# Patient Record
Sex: FEMALE | Race: WHITE | NOT HISPANIC OR LATINO | Employment: FULL TIME | URBAN - METROPOLITAN AREA
[De-identification: names, ages, dates, MRNs, and addresses within clinical notes are randomized per-mention and may not be internally consistent; named-entity substitution may affect disease eponyms.]

---

## 2017-02-27 ENCOUNTER — OFFICE VISIT (OUTPATIENT)
Dept: OBSTETRICS AND GYNECOLOGY | Age: 37
End: 2017-02-27

## 2017-02-27 VITALS
BODY MASS INDEX: 22.5 KG/M2 | HEIGHT: 63 IN | DIASTOLIC BLOOD PRESSURE: 80 MMHG | WEIGHT: 127 LBS | SYSTOLIC BLOOD PRESSURE: 100 MMHG

## 2017-02-27 DIAGNOSIS — Z01.419 ENCOUNTER FOR GYNECOLOGICAL EXAMINATION WITHOUT ABNORMAL FINDING: Primary | ICD-10-CM

## 2017-02-27 DIAGNOSIS — Z15.01 BRCA2 GENETIC CARRIER: ICD-10-CM

## 2017-02-27 DIAGNOSIS — Z15.09 BRCA2 GENETIC CARRIER: ICD-10-CM

## 2017-02-27 PROCEDURE — 99395 PREV VISIT EST AGE 18-39: CPT | Performed by: OBSTETRICS & GYNECOLOGY

## 2017-02-27 NOTE — PROGRESS NOTES
"Routine Annual Visit    2017    Patient: Jasmin Fuentes          MR#:[id number]      Chief Complaint   Patient presents with   • Annual Exam     PT HERE FOR ANNUAL EXAM, NO COMPLAINTS. LAST PAP  (NEG AND NEG HPV).       History of Present Illness    36 y.o. female  who presents for annual exam.   Girls 10,6 doing well  S/p mastectomy and reconstruction BRCA 2  BTL for BCM  Reg menses , no complaints          Patient's last menstrual period was 2017 (exact date).  Obstetric History:  OB History      Para Term  AB TAB SAB Ectopic Multiple Living    2 1 1       2         Menstrual History:     Patient's last menstrual period was 2017 (exact date).       Sexual History:       ________________________________________  Patient Active Problem List   Diagnosis   • BRCA2 genetic carrier       Past Medical History   Diagnosis Date   • BRCA2 positive    • DVT (deep venous thrombosis)    • Factor V Leiden mutation        No past surgical history on file.    History   Smoking Status   • Never Smoker   Smokeless Tobacco   • Not on file       has a current medication list which includes the following prescription(s): valacyclovir.  ________________________________________    Current contraception: tubal ligation  History of abnormal Pap smear: no  Family history of Breast cancer: yes  Family history of uterine or ovarian cancer: no  Family History of colon cancer/colon polyps: no  History of abnormal mammogram: no      The following portions of the patient's history were reviewed and updated as appropriate: allergies, current medications, past family history, past medical history, past social history, past surgical history and problem list.    Review of Systems    Pertinent items are noted in HPI.     Objective   Physical Exam    Visit Vitals   • /80   • Ht 63\" (160 cm)   • Wt 127 lb (57.6 kg)   • LMP 2017 (Exact Date)   • BMI 22.5 kg/m2      BP Readings from Last 3 " "Encounters:   02/27/17 100/80      Wt Readings from Last 3 Encounters:   02/27/17 127 lb (57.6 kg)      BMI: Estimated body mass index is 22.5 kg/(m^2) as calculated from the following:    Height as of this encounter: 63\" (160 cm).    Weight as of this encounter: 127 lb (57.6 kg).      General:   alert, appears stated age and cooperative   Abdomen: soft, non-tender, without masses or organomegaly   Breast: inspection negative, no nipple discharge or bleeding, no masses or nodularity palpable- all implant, no breast tissue   Vulva: normal   Vagina: normal mucosa   Cervix: no cervical motion tenderness and no lesions   Uterus: normal size, mobile or non-tender   Adnexa: normal adnexa and no mass, fullness, tenderness     Assessment:    1. Normal annual exam   Assessment     ICD-10-CM ICD-9-CM   1. Encounter for gynecological examination without abnormal finding Z01.419 V72.31   2. BRCA2 genetic carrier Z15.01 V84.01    Z15.02      Plan:    Plan     []  Mammogram request made  []  PAP done  []  Labs:   []  GC/Chl/TV  []  DEXA scan   []  Referral for colonoscopy:     No mammo- no breast tissue  sched GYN US  rec BSO age 38  "

## 2017-03-23 ENCOUNTER — PROCEDURE VISIT (OUTPATIENT)
Dept: OBSTETRICS AND GYNECOLOGY | Age: 37
End: 2017-03-23

## 2017-03-23 ENCOUNTER — OFFICE VISIT (OUTPATIENT)
Dept: OBSTETRICS AND GYNECOLOGY | Age: 37
End: 2017-03-23

## 2017-03-23 VITALS
BODY MASS INDEX: 23.55 KG/M2 | HEIGHT: 62 IN | DIASTOLIC BLOOD PRESSURE: 66 MMHG | SYSTOLIC BLOOD PRESSURE: 110 MMHG | WEIGHT: 128 LBS

## 2017-03-23 DIAGNOSIS — Z15.09 BRCA2 POSITIVE: Primary | ICD-10-CM

## 2017-03-23 DIAGNOSIS — D68.51 FACTOR 5 LEIDEN MUTATION, HETEROZYGOUS (HCC): ICD-10-CM

## 2017-03-23 DIAGNOSIS — Z15.09 BRCA2 GENETIC CARRIER: Primary | ICD-10-CM

## 2017-03-23 DIAGNOSIS — Z15.01 BRCA2 GENETIC CARRIER: Primary | ICD-10-CM

## 2017-03-23 DIAGNOSIS — Z15.01 BRCA2 POSITIVE: Primary | ICD-10-CM

## 2017-03-23 PROCEDURE — 99213 OFFICE O/P EST LOW 20 MIN: CPT | Performed by: OBSTETRICS & GYNECOLOGY

## 2017-03-23 PROCEDURE — 76830 TRANSVAGINAL US NON-OB: CPT | Performed by: OBSTETRICS & GYNECOLOGY

## 2017-03-23 RX ORDER — FLUTICASONE PROPIONATE 50 MCG
2 SPRAY, SUSPENSION (ML) NASAL DAILY
COMMUNITY

## 2017-03-23 RX ORDER — GUAIFENESIN 600 MG/1
1200 TABLET, EXTENDED RELEASE ORAL 2 TIMES DAILY
COMMUNITY
End: 2018-03-05

## 2017-03-23 NOTE — PROGRESS NOTES
"GYN Visit    3/23/2017    Patient: Jasmin Fuentes          MR#:[id number]      Chief Complaint   Patient presents with   • Imaging Only     PT HERE FOR GYN U/S DUE TO BRCA GENE 2 CARRIER. DOING WELL.       History of Present Illness    36 y.o. female  who presents for  Follow up US for BRCA 2 positive  Recommendation is 6 month US with   Remove ovaries at age 38  Pt wants to do BSO age 39  Menses regular, regular cramping,no unusual bloating  Cannot take ocps secondary to history of DVT and FV leiden  2 stable problems- surveillance today        Patient's last menstrual period was 2017 (exact date).    ________________________________________  Patient Active Problem List   Diagnosis   • BRCA2 genetic carrier   • Factor 5 Leiden mutation, heterozygous       Past Medical History:   Diagnosis Date   • BRCA2 positive    • DVT (deep venous thrombosis)    • Factor V Leiden mutation        No past surgical history on file.    History   Smoking Status   • Never Smoker   Smokeless Tobacco   • Not on file       has a current medication list which includes the following prescription(s): fluticasone and guaifenesin.  ________________________________________    Current contraception: tubal ligation      The following portions of the patient's history were reviewed and updated as appropriate: allergies, current medications, past family history, past medical history, past social history, past surgical history and problem list.    Review of Systems   Constitutional: Negative for appetite change.   Gastrointestinal: Negative for abdominal pain, constipation, diarrhea, nausea and vomiting.   Genitourinary: Negative for difficulty urinating, dysuria, menstrual problem and pelvic pain.   All other systems reviewed and are negative.           Objective   Physical Exam    /66  Ht 62\" (157.5 cm)  Wt 128 lb (58.1 kg)  LMP 2017 (Exact Date)  BMI 23.41 kg/m2   BP Readings from Last 3 Encounters: " "  03/23/17 110/66   02/27/17 100/80      Wt Readings from Last 3 Encounters:   03/23/17 128 lb (58.1 kg)   02/27/17 127 lb (57.6 kg)      BMI: Estimated body mass index is 23.41 kg/(m^2) as calculated from the following:    Height as of this encounter: 62\" (157.5 cm).    Weight as of this encounter: 128 lb (58.1 kg).      General:   alert, appears stated age and cooperative   Abdomen: soft, non-tender, without masses or organomegaly   Breast: Not examined today,no breast tissue   Vulva: normal   Vagina: normal mucosa   Cervix: no cervical motion tenderness and no lesions   Uterus: normal size, mobile or non-tender   Adnexa: normal adnexa and no mass, fullness, tenderness     US done in office:  See report- nl US, no free fluid, ovaries normal, non tender, uterus normal    Assessment:      Jasmin was seen today for imaging only.    Diagnoses and all orders for this visit:    BRCA2 positive  -         Factor 5 Leiden mutation, heterozygous        Discussed follow up with pt rec 6 month US with  and BSO around age 38  See genetics consult scanned into epic from 2008      "

## 2017-03-24 LAB — CANCER AG125 SERPL-ACNC: 56.7 U/ML (ref 0–38.1)

## 2017-03-27 ENCOUNTER — TELEPHONE (OUTPATIENT)
Dept: OBSTETRICS AND GYNECOLOGY | Age: 37
End: 2017-03-27

## 2017-03-27 DIAGNOSIS — R97.1 ELEVATED CA-125: ICD-10-CM

## 2017-03-27 DIAGNOSIS — Z15.09 BRCA2 GENETIC CARRIER: Primary | ICD-10-CM

## 2017-03-27 DIAGNOSIS — Z15.01 BRCA2 GENETIC CARRIER: Primary | ICD-10-CM

## 2017-03-27 NOTE — TELEPHONE ENCOUNTER
Spoke with pt regarding elevated - rec gyn onc consult to discuss and consider BSO.  Pt with no ovarian mass or free fluid on GYN US

## 2017-08-07 ENCOUNTER — TELEPHONE (OUTPATIENT)
Dept: OBSTETRICS AND GYNECOLOGY | Age: 37
End: 2017-08-07

## 2017-08-07 RX ORDER — VALACYCLOVIR HYDROCHLORIDE 500 MG/1
500 TABLET, FILM COATED ORAL 2 TIMES DAILY
Qty: 30 TABLET | Refills: 1 | Status: SHIPPED | OUTPATIENT
Start: 2017-08-07 | End: 2017-08-08 | Stop reason: SDUPTHER

## 2017-08-07 NOTE — TELEPHONE ENCOUNTER
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 Pt would like more than just the one dose if possible. Pharm on file.    Pt # [phone]

## 2017-08-07 NOTE — TELEPHONE ENCOUNTER
Pt aware you are out of office this week. Pt states she is done seeing Dr. Medel, wants to know if she needs to F/U with you.    Pt # [phone]

## 2017-08-08 NOTE — TELEPHONE ENCOUNTER
I recommend follow up at AE time end of feb or beginning of march unless she has any GYN issues she would like to discuss

## 2017-08-09 RX ORDER — VALACYCLOVIR HYDROCHLORIDE 500 MG/1
TABLET, FILM COATED ORAL
Qty: 90 TABLET | Refills: 0 | Status: SHIPPED | OUTPATIENT
Start: 2017-08-09 | End: 2018-04-09 | Stop reason: SDUPTHER

## 2017-09-07 ENCOUNTER — OFFICE VISIT (OUTPATIENT)
Dept: OBSTETRICS AND GYNECOLOGY | Age: 37
End: 2017-09-07

## 2017-09-07 VITALS
WEIGHT: 128 LBS | BODY MASS INDEX: 23.55 KG/M2 | HEIGHT: 62 IN | SYSTOLIC BLOOD PRESSURE: 100 MMHG | DIASTOLIC BLOOD PRESSURE: 62 MMHG

## 2017-09-07 DIAGNOSIS — D68.51 FACTOR 5 LEIDEN MUTATION, HETEROZYGOUS (HCC): ICD-10-CM

## 2017-09-07 DIAGNOSIS — Z15.09 BRCA2 GENETIC CARRIER: Primary | ICD-10-CM

## 2017-09-07 DIAGNOSIS — N94.19 DYSPAREUNIA DUE TO MEDICAL CONDITION IN FEMALE: ICD-10-CM

## 2017-09-07 DIAGNOSIS — G47.01 INSOMNIA DUE TO MEDICAL CONDITION: ICD-10-CM

## 2017-09-07 DIAGNOSIS — Z15.01 BRCA2 GENETIC CARRIER: Primary | ICD-10-CM

## 2017-09-07 PROCEDURE — 99213 OFFICE O/P EST LOW 20 MIN: CPT | Performed by: OBSTETRICS & GYNECOLOGY

## 2017-09-07 RX ORDER — DIPHENHYDRAMINE HCL 25 MG
25 CAPSULE ORAL EVERY 6 HOURS PRN
COMMUNITY

## 2017-09-07 NOTE — PROGRESS NOTES
GYN Visit    2017    Patient: Jasmin Fuentes          MR#:[id number]      Chief Complaint   Patient presents with   • Follow-up     PT HERE FOR F/U, SEEN BACK IN MARCH. BRCA2 GENE CARRIER ALONG WITH FACTOR V.  TODAY.       History of Present Illness    37 y.o. female  who presents for  Follow up   Pt had LTH with BSO for elevated  and Brca 2 - 15 with oncologist- benign path  No reason to re check   Having new problem of menopausal symptoms and dyspareunia and insomnia  Feels like doing better with everything over past 3 months but still issues  With dyspareunia and insomnia  Discussed multiple options, ambien discussed  Antidepressant discussed and jaciel villa  Also discussed lubricants and vaginal estrogen- (extremely low dose of   0.5 gm 3 x weekly proven effective)- pt would have to clear with hematology  Exam today to see if vagina shortened from surgery vs. atrophy        Patient's last menstrual period was 2017 (exact date).    ________________________________________  Patient Active Problem List   Diagnosis   • BRCA2 genetic carrier   • Factor 5 Leiden mutation, heterozygous   • Elevated CA-125       Past Medical History:   Diagnosis Date   • BRCA2 positive    • DVT (deep venous thrombosis)    • Factor V Leiden mutation    • Irregular menses    • Phlebitis    • Duran syndrome        Past Surgical History:   Procedure Laterality Date   • BREAST RECONSTRUCTION     •  SECTION     • TUBAL ABDOMINAL LIGATION         History   Smoking Status   • Never Smoker   Smokeless Tobacco   • Not on file       has a current medication list which includes the following prescription(s): diphenhydramine, fluticasone, guaifenesin, and valacyclovir.  ________________________________________    Current contraception: status post hysterectomy      The following portions of the patient's history were reviewed and updated as appropriate: allergies, current medications, past  "family history, past medical history, past social history, past surgical history and problem list.    Review of Systems   Constitutional: Negative for fatigue.   Gastrointestinal: Negative for diarrhea, nausea and vomiting.   Genitourinary: Positive for dyspareunia. Negative for pelvic pain.   Neurological: Negative for headaches.   Psychiatric/Behavioral: Negative for dysphoric mood. The patient is not nervous/anxious.    All other systems reviewed and are negative.           Objective   Physical Exam    /62  Ht 62\" (157.5 cm)  Wt 128 lb (58.1 kg)  LMP 03/23/2017 (Exact Date)  BMI 23.41 kg/m2   BP Readings from Last 3 Encounters:   09/07/17 100/62   03/23/17 110/66   02/27/17 100/80      Wt Readings from Last 3 Encounters:   09/07/17 128 lb (58.1 kg)   03/23/17 128 lb (58.1 kg)   02/27/17 127 lb (57.6 kg)      BMI: Estimated body mass index is 23.41 kg/(m^2) as calculated from the following:    Height as of this encounter: 62\" (157.5 cm).    Weight as of this encounter: 128 lb (58.1 kg).      General:   alert, appears stated age and cooperative   Abdomen: soft, non-tender, without masses or organomegaly   Breast:    Vulva: normal   Vagina: normal mucosa, atrophy already present, no shortening of vagina but definitely smooth and atrophic mucosa   Cervix: absent   Uterus: absent    Adnexa: no mass, fullness, tenderness     Assessment:      Jasmin was seen today for follow-up.    Diagnoses and all orders for this visit:    BRCA2 genetic carrier  -     Cancel:     Factor 5 Leiden mutation, heterozygous  -     Cancel:     Insomnia due to medical condition    Dyspareunia due to medical condition in female      ambien 10 mg #30  Discussed vaginal estrogen,  Wilma martin touch, and lubricants  Observe menopausal symptoms,  Follow yearly    Pt will ask about vaginal estrogen- extremely low and topical dose- will get hematology opinion        "

## 2017-09-08 ENCOUNTER — TELEPHONE (OUTPATIENT)
Dept: OBSTETRICS AND GYNECOLOGY | Age: 37
End: 2017-09-08

## 2017-09-08 NOTE — TELEPHONE ENCOUNTER
Pt states she asked hematology and it is ok to start the vag estrogen. Pt would like to use her [facility] on file.     Pt # [phone]

## 2017-09-21 ENCOUNTER — TELEPHONE (OUTPATIENT)
Dept: OBSTETRICS AND GYNECOLOGY | Age: 37
End: 2017-09-21

## 2017-09-21 NOTE — TELEPHONE ENCOUNTER
Possibly.  Estrogen is shown to be cardioprotective and can have a positive benefit to cholesterol.  But other factor could be considered like family history and diet.  I would recommend f/u with her PCP.  And I would also point out her HDL (the good cholesterol) has actually increased positively.

## 2017-09-21 NOTE — TELEPHONE ENCOUNTER
Dr ESTES pt, concerned bc her cholesterol has increased from last year and pt states still working out 1 hour every day and eats healthy. Pt states she had a hysterectomy with Dr. Medel In June. Wanting to know if that could have changed anything?  This years labs:          Last year labs:  Total cho, 221  172    85  HDL 85  77  BMI 22.9, 5foot 2inches, 125lbs, last year was 127lbs.    Pt # [phone]

## 2017-09-21 NOTE — TELEPHONE ENCOUNTER
Pt not'd, has not started the premarin vag cream sent in bc of cost. Pt state pharm can start PA, but pt wasn't sure what that was. I advised pt to ask pharm to start process so we can try and get her the medication. Her cost with discount is still $195

## 2017-10-11 ENCOUNTER — TELEPHONE (OUTPATIENT)
Dept: OBSTETRICS AND GYNECOLOGY | Age: 37
End: 2017-10-11

## 2017-10-11 NOTE — TELEPHONE ENCOUNTER
Dr ESTES gave the pt a Rx for Ambien and she does not like the side effects, leaves a horrible taste in her mouth, has tried cutting it in half - no help - if she uses the full amount pt feels like she is in a fog, pt is requesting Lunesta.

## 2017-10-16 RX ORDER — ESZOPICLONE 1 MG/1
1 TABLET, FILM COATED ORAL NIGHTLY
Qty: 30 TABLET | Refills: 1 | Status: SHIPPED | OUTPATIENT
Start: 2017-10-16 | End: 2020-11-30 | Stop reason: DRUGHIGH

## 2017-10-16 NOTE — TELEPHONE ENCOUNTER
Notify I sent in lunesta, I think it just went over- no need for green script, have pt call to check with pharmacy before picking it up

## 2018-03-05 ENCOUNTER — OFFICE VISIT (OUTPATIENT)
Dept: OBSTETRICS AND GYNECOLOGY | Age: 38
End: 2018-03-05

## 2018-03-05 VITALS
BODY MASS INDEX: 25.4 KG/M2 | SYSTOLIC BLOOD PRESSURE: 102 MMHG | DIASTOLIC BLOOD PRESSURE: 70 MMHG | HEIGHT: 62 IN | WEIGHT: 138 LBS

## 2018-03-05 DIAGNOSIS — N95.1 MENOPAUSAL SYMPTOMS: ICD-10-CM

## 2018-03-05 DIAGNOSIS — Z01.419 ENCOUNTER FOR GYNECOLOGICAL EXAMINATION WITHOUT ABNORMAL FINDING: Primary | ICD-10-CM

## 2018-03-05 PROCEDURE — 99395 PREV VISIT EST AGE 18-39: CPT | Performed by: OBSTETRICS & GYNECOLOGY

## 2018-03-05 NOTE — PROGRESS NOTES
Routine Annual Visit    3/5/2018    Patient: Jasmin Fuentes          MR#:[id number]      Chief Complaint   Patient presents with   • Annual Exam     PT HERE FOR ROUTINE AE, SHE IS WELL WITH NO COMPLAINTS. LAST PAP  (NEG AND NEG HPV).       History of Present Illness    37 y.o. female  who presents for annual exam.   She is doing well  Bilateral mastectomy and TLH/BSO for BRCA 2 carrier  Also with FV Leiden and FH DVT and PE (father age 73)  Pt using lunesta for sleep and vag estrogen 0.5 gm twice weekly (ok'd this with hematology)  Noticed some weight gain but has not been exercising            Patient's last menstrual period was 2017 (exact date).  Obstetric History:  OB History      Para Term  AB Living    2 1 1   2    SAB TAB Ectopic Multiple Live Births        2         Menstrual History:     Patient's last menstrual period was 2017 (exact date).       Sexual History:       ________________________________________  Patient Active Problem List   Diagnosis   • BRCA2 genetic carrier   • Factor 5 Leiden mutation, heterozygous   • Elevated CA-125       Past Medical History:   Diagnosis Date   • BRCA2 positive    • DVT (deep venous thrombosis)    • Factor V Leiden mutation    • Irregular menses    • Phlebitis    • Duran syndrome        Past Surgical History:   Procedure Laterality Date   • BREAST RECONSTRUCTION     •  SECTION     • TUBAL ABDOMINAL LIGATION         History   Smoking Status   • Never Smoker   Smokeless Tobacco   • Not on file       has a current medication list which includes the following prescription(s): conjugated estrogens, eszopiclone, diphenhydramine, fluticasone, and valacyclovir.  ________________________________________    Current contraception: status post hysterectomy  History of abnormal Pap smear: no  Family history of Breast cancer:   Family history of uterine or ovarian cancer: no  Family History of colon cancer/colon polyps: no  History  "of abnormal mammogram: no      The following portions of the patient's history were reviewed and updated as appropriate: allergies, current medications, past family history, past medical history, past social history, past surgical history and problem list.    Review of Systems    Pertinent items are noted in HPI.     Objective   Physical Exam    /70  Ht 157.5 cm (62\")  Wt 62.6 kg (138 lb)  LMP 03/23/2017 (Exact Date)  BMI 25.24 kg/m2   BP Readings from Last 3 Encounters:   03/05/18 102/70   09/07/17 100/62   03/23/17 110/66      Wt Readings from Last 3 Encounters:   03/05/18 62.6 kg (138 lb)   09/07/17 58.1 kg (128 lb)   03/23/17 58.1 kg (128 lb)      BMI: Estimated body mass index is 25.24 kg/(m^2) as calculated from the following:    Height as of this encounter: 157.5 cm (62\").    Weight as of this encounter: 62.6 kg (138 lb).      General:   alert, appears stated age and cooperative   Abdomen: soft, non-tender, without masses or organomegaly   Breast: inspection negative, no nipple discharge or bleeding, no masses or nodularity palpable and no actual breast tissue   Vulva: normal   Vagina: normal mucosa   Cervix: absent   Uterus: absent    Adnexa: no mass, fullness, tenderness     Assessment:    1. Normal annual exam   Assessment     ICD-10-CM ICD-9-CM   1. Encounter for gynecological examination without abnormal finding Z01.419 V72.31   2. Menopausal symptoms N95.1 627.2     Plan:    Plan     []  Mammogram request made  [x]  PAP done  []  Labs:   []  GC/Chl/TV  []  DEXA scan   []  Referral for colonoscopy:       Jasmin was seen today for annual exam.    Diagnoses and all orders for this visit:    Encounter for gynecological examination without abnormal finding  -     Cancel: IGP, Aptima HPV, Rfx 16 / 18,45    Menopausal symptoms      Continue small amount of vaginal estrogen  Encouraged exercise      Counseling:  --Nutrition: Stressed importance of moderation and caloric balance, stressed fresh fruit " and vegetables  --Exercise: Stressed the importance of regular exercise. 3-5 times weekly   - Discussed screening mammogram recommendations.   --Discussed benefits of screening colonoscopy- age 50 unless FH  --Discussed pap smear screening recommendations

## 2018-04-09 RX ORDER — VALACYCLOVIR HYDROCHLORIDE 500 MG/1
TABLET, FILM COATED ORAL
Qty: 90 TABLET | Refills: 0 | Status: SHIPPED | OUTPATIENT
Start: 2018-04-09 | End: 2018-10-11 | Stop reason: SDUPTHER

## 2018-10-11 RX ORDER — VALACYCLOVIR HYDROCHLORIDE 500 MG/1
TABLET, FILM COATED ORAL
Qty: 90 TABLET | Refills: 0 | Status: SHIPPED | OUTPATIENT
Start: 2018-10-11 | End: 2018-12-29 | Stop reason: SDUPTHER

## 2018-12-31 RX ORDER — VALACYCLOVIR HYDROCHLORIDE 500 MG/1
TABLET, FILM COATED ORAL
Qty: 90 TABLET | Refills: 0 | Status: SHIPPED | OUTPATIENT
Start: 2018-12-31 | End: 2019-03-12 | Stop reason: SDUPTHER

## 2019-03-13 ENCOUNTER — OFFICE VISIT (OUTPATIENT)
Dept: OBSTETRICS AND GYNECOLOGY | Age: 39
End: 2019-03-13

## 2019-03-13 VITALS
DIASTOLIC BLOOD PRESSURE: 66 MMHG | HEIGHT: 62 IN | BODY MASS INDEX: 24.11 KG/M2 | WEIGHT: 131 LBS | SYSTOLIC BLOOD PRESSURE: 106 MMHG

## 2019-03-13 DIAGNOSIS — D68.51 FACTOR 5 LEIDEN MUTATION, HETEROZYGOUS (HCC): ICD-10-CM

## 2019-03-13 DIAGNOSIS — Z15.01 BRCA2 GENETIC CARRIER: ICD-10-CM

## 2019-03-13 DIAGNOSIS — N95.2 VAGINAL ATROPHY: ICD-10-CM

## 2019-03-13 DIAGNOSIS — Z01.419 ENCOUNTER FOR GYNECOLOGICAL EXAMINATION WITHOUT ABNORMAL FINDING: Primary | ICD-10-CM

## 2019-03-13 DIAGNOSIS — Z15.09 BRCA2 GENETIC CARRIER: ICD-10-CM

## 2019-03-13 PROCEDURE — 99396 PREV VISIT EST AGE 40-64: CPT | Performed by: OBSTETRICS & GYNECOLOGY

## 2019-03-13 RX ORDER — CETIRIZINE HYDROCHLORIDE 10 MG/1
10 TABLET ORAL DAILY
COMMUNITY

## 2019-03-13 NOTE — PROGRESS NOTES
Routine Annual Visit    3/13/2019    Patient: Jasmin Fuentes          MR#:[id number]      Chief Complaint   Patient presents with   • Annual Exam     PT HERE FOR ROUTINE AE, NO MG'S DUE TO DOUBLE MASTECTOMY. SHE IS WELL WITH NO COMPLAINTS. LAST PAP  NEG AND NEG HPV. PT HAS HAD HYST.       History of Present Illness    38 y.o. female  who presents for annual exam.   Some HF but tolerable, using vaginal estrogen every once in a while  Getting a lot of exercise          Patient's last menstrual period was 2017 (exact date).  Obstetric History:  OB History      Para Term  AB Living    2 1 1     2    SAB TAB Ectopic Molar Multiple Live Births              2         Menstrual History:     Patient's last menstrual period was 2017 (exact date).       Sexual History:       ________________________________________  Patient Active Problem List   Diagnosis   • BRCA2 genetic carrier   • Factor 5 Leiden mutation, heterozygous (CMS/HCC)   • Elevated CA-125       Past Medical History:   Diagnosis Date   • BRCA2 positive    • DVT (deep venous thrombosis) (CMS/HCC)    • Factor V Leiden mutation (CMS/HCC)    • Irregular menses    • Phlebitis    • Duran syndrome        Past Surgical History:   Procedure Laterality Date   • BREAST RECONSTRUCTION     •  SECTION     • TUBAL ABDOMINAL LIGATION         Social History     Tobacco Use   Smoking Status Never Smoker   Smokeless Tobacco Never Used       has a current medication list which includes the following prescription(s): cetirizine, eszopiclone, valacyclovir, conjugated estrogens, diphenhydramine, and fluticasone.  ________________________________________    Current contraception: status post hysterectomy  History of abnormal Pap smear: no  Family history of Breast cancer: see hist  Family history of uterine or ovarian cancer: yes - see history  Family History of colon cancer/colon polyps: no  History of abnormal mammogram: no      The  "following portions of the patient's history were reviewed and updated as appropriate: allergies, current medications, past family history, past medical history, past social history, past surgical history and problem list.    Review of Systems    Pertinent items are noted in HPI.     Objective   Physical Exam    /66   Ht 157.5 cm (62\")   Wt 59.4 kg (131 lb)   LMP 03/23/2017 (Exact Date)   BMI 23.96 kg/m²    BP Readings from Last 3 Encounters:   03/13/19 106/66   08/28/18 121/74   03/05/18 102/70      Wt Readings from Last 3 Encounters:   03/13/19 59.4 kg (131 lb)   08/28/18 60.8 kg (134 lb)   03/05/18 62.6 kg (138 lb)      BMI: Estimated body mass index is 23.96 kg/m² as calculated from the following:    Height as of this encounter: 157.5 cm (62\").    Weight as of this encounter: 59.4 kg (131 lb).      General:   alert, appears stated age and cooperative   Abdomen: soft, non-tender, without masses or organomegaly   Breast: inspection negative, no nipple discharge or bleeding, no masses or nodularity palpable and implants , no actual breast tissue   Vulva: normal   Vagina: normal mucosa   Cervix: absent   Uterus: absent    Adnexa: no mass, fullness, tenderness     Assessment:    1. Normal annual exam   Assessment     ICD-10-CM ICD-9-CM   1. Encounter for gynecological examination without abnormal finding Z01.419 V72.31     Plan:    Plan     []  Mammogram request made  []  PAP done  []  Labs:   []  GC/Chl/TV  []  DEXA scan   []  Referral for colonoscopy:       Jasmin was seen today for annual exam.    Diagnoses and all orders for this visit:    Encounter for gynecological examination without abnormal finding      imvexxy and intrarosa samples given to try      Counseling:  --Nutrition: Stressed importance of moderation and caloric balance, stressed fresh fruit and vegetables  --Exercise: Stressed the importance of regular exercise. 3-5 times weekly   - Discussed screening mammogram recommendations. "   --Discussed benefits of screening colonoscopy- age 45 unless FH  --Discussed pap smear screening recommendations

## 2019-03-14 RX ORDER — VALACYCLOVIR HYDROCHLORIDE 500 MG/1
TABLET, FILM COATED ORAL
Qty: 90 TABLET | Refills: 0 | Status: SHIPPED | OUTPATIENT
Start: 2019-03-14 | End: 2019-05-23 | Stop reason: SDUPTHER

## 2019-05-23 RX ORDER — VALACYCLOVIR HYDROCHLORIDE 500 MG/1
TABLET, FILM COATED ORAL
Qty: 90 TABLET | Refills: 0 | Status: SHIPPED | OUTPATIENT
Start: 2019-05-23 | End: 2019-08-06 | Stop reason: SDUPTHER

## 2019-08-06 RX ORDER — VALACYCLOVIR HYDROCHLORIDE 500 MG/1
TABLET, FILM COATED ORAL
Qty: 90 TABLET | Refills: 0 | Status: SHIPPED | OUTPATIENT
Start: 2019-08-06 | End: 2019-10-15 | Stop reason: SDUPTHER

## 2019-10-15 RX ORDER — VALACYCLOVIR HYDROCHLORIDE 500 MG/1
TABLET, FILM COATED ORAL
Qty: 90 TABLET | Refills: 1 | Status: SHIPPED | OUTPATIENT
Start: 2019-10-15 | End: 2020-03-18

## 2020-03-18 RX ORDER — VALACYCLOVIR HYDROCHLORIDE 500 MG/1
TABLET, FILM COATED ORAL
Qty: 90 TABLET | Refills: 1 | Status: SHIPPED | OUTPATIENT
Start: 2020-03-18 | End: 2020-08-20

## 2020-08-20 RX ORDER — VALACYCLOVIR HYDROCHLORIDE 500 MG/1
TABLET, FILM COATED ORAL
Qty: 90 TABLET | Refills: 0 | Status: SHIPPED | OUTPATIENT
Start: 2020-08-20

## 2020-11-30 ENCOUNTER — OFFICE VISIT (OUTPATIENT)
Dept: OBSTETRICS AND GYNECOLOGY | Age: 40
End: 2020-11-30

## 2020-11-30 VITALS — SYSTOLIC BLOOD PRESSURE: 110 MMHG | WEIGHT: 135 LBS | DIASTOLIC BLOOD PRESSURE: 68 MMHG | BODY MASS INDEX: 24.69 KG/M2

## 2020-11-30 DIAGNOSIS — N95.2 VAGINAL ATROPHY: ICD-10-CM

## 2020-11-30 DIAGNOSIS — I83.812 VARICOSE VEINS OF LEFT LOWER EXTREMITY WITH PAIN: ICD-10-CM

## 2020-11-30 DIAGNOSIS — Z01.419 ENCOUNTER FOR GYNECOLOGICAL EXAMINATION (GENERAL) (ROUTINE) WITHOUT ABNORMAL FINDINGS: Primary | ICD-10-CM

## 2020-11-30 DIAGNOSIS — M79.605 PAIN OF LEFT LOWER EXTREMITY: ICD-10-CM

## 2020-11-30 PROCEDURE — 99396 PREV VISIT EST AGE 40-64: CPT | Performed by: OBSTETRICS & GYNECOLOGY

## 2020-11-30 RX ORDER — BUPROPION HYDROCHLORIDE 150 MG/1
150 TABLET ORAL DAILY
COMMUNITY
Start: 2020-11-08 | End: 2020-11-30 | Stop reason: SDUPTHER

## 2020-11-30 RX ORDER — CONJUGATED ESTROGENS 0.62 MG/G
CREAM VAGINAL DAILY
Qty: 30 G | Refills: 3 | Status: SHIPPED | OUTPATIENT
Start: 2020-11-30 | End: 2022-04-18 | Stop reason: SDUPTHER

## 2020-11-30 RX ORDER — BUPROPION HYDROCHLORIDE 150 MG/1
150 TABLET ORAL DAILY
Qty: 30 TABLET | Refills: 3 | Status: SHIPPED | OUTPATIENT
Start: 2020-11-30 | End: 2021-04-21 | Stop reason: SDUPTHER

## 2020-11-30 RX ORDER — ESZOPICLONE 3 MG/1
3 TABLET, FILM COATED ORAL DAILY
COMMUNITY
Start: 2020-08-18

## 2020-11-30 NOTE — PROGRESS NOTES
Routine Annual Visit    2020    Patient: Jasmin Fuentes          MR#:[id number]      Chief Complaint   Patient presents with   • Gynecologic Exam     AE and MED check.  HYST.  Has had bilateral mastectomy.   Having hot flashes, premarin cream too expensive, she is having vaginal dryness. Has vein on leg whe would like you to look at        History of Present Illness    40 y.o. female  who presents for annual exam.   No pap- hyst  No mammo- bilateral mastectomy  Menopausal symptoms including anxiety and depression- wellbutrin helps and will try the 300mg and wants me to write for it  Also some pain in varicosities on left leg, will do duplex- 1 week history, history of DVT in this leg in past  rec ASA for 2 weeks,           Patient's last menstrual period was 2017 (exact date).  Obstetric History:  OB History        2    Para   1    Term   1            AB        Living   2       SAB        TAB        Ectopic        Molar        Multiple        Live Births   2               Menstrual History:     Patient's last menstrual period was 2017 (exact date).       Sexual History:       ________________________________________  Patient Active Problem List   Diagnosis   • BRCA2 gene mutation positive   • Factor 5 Leiden mutation, heterozygous (CMS/HCC)   • Elevated CA-125       Past Medical History:   Diagnosis Date   • BRCA2 positive    • DVT (deep venous thrombosis) (CMS/HCC)    • Factor V Leiden mutation (CMS/HCC)    • Irregular menses    • Phlebitis    • Duran syndrome        Past Surgical History:   Procedure Laterality Date   • BREAST RECONSTRUCTION     •  SECTION     • MASTECTOMY     • TUBAL ABDOMINAL LIGATION         Social History     Tobacco Use   Smoking Status Never Smoker   Smokeless Tobacco Never Used       has a current medication list which includes the following prescription(s): bupropion xl, cetirizine, eszopiclone, fluticasone, valacyclovir, premarin, and  "diphenhydramine.  ________________________________________    Current contraception: status post hysterectomy  History of abnormal Pap smear: no  Family history of Breast cancer: yes  Family history of uterine or ovarian cancer: yes - pt is BRCA 2 carrier  Family History of colon cancer/colon polyps: no  History of abnormal mammogram: no      The following portions of the patient's history were reviewed and updated as appropriate: allergies, current medications, past family history, past medical history, past social history, past surgical history and problem list.    Review of Systems    Pertinent items are noted in HPI.     Objective   Physical Exam    /68   Wt 61.2 kg (135 lb)   LMP 03/23/2017 (Exact Date)   Breastfeeding No   BMI 24.69 kg/m²    BP Readings from Last 3 Encounters:   11/30/20 110/68   03/13/19 106/66   08/28/18 121/74      Wt Readings from Last 3 Encounters:   11/30/20 61.2 kg (135 lb)   03/13/19 59.4 kg (131 lb)   08/28/18 60.8 kg (134 lb)      BMI: Estimated body mass index is 24.69 kg/m² as calculated from the following:    Height as of 3/13/19: 157.5 cm (62\").    Weight as of this encounter: 61.2 kg (135 lb).      General:   alert, appears stated age and cooperative   Abdomen: soft, non-tender, without masses or organomegaly   Breast: inspection negative, no nipple discharge or bleeding, no masses or nodularity palpable and no actual breast tissue   Vulva: normal   Vagina: normal mucosa   Cervix: absent   Uterus: absent    Adnexa: no mass, fullness, tenderness     Assessment:    1. Normal annual exam   Assessment     ICD-10-CM ICD-9-CM   1. Encounter for gynecological examination (general) (routine) without abnormal findings  Z01.419 V72.31   2. Vaginal atrophy  N95.2 627.3   3. Pain of left lower extremity  M79.605 729.5   4. Varicose veins of left lower extremity with pain  I83.812 454.8     Plan:    Plan     []  Mammogram request made  []  PAP done  []  Labs:   []  GC/Chl/TV  []  " DEXA scan   []  Referral for colonoscopy:       Diagnoses and all orders for this visit:    1. Encounter for gynecological examination (general) (routine) without abnormal findings (Primary)    2. Vaginal atrophy    3. Pain of left lower extremity  -     Duplex Venous Lower Extremity - Bilateral CAR; Future    4. Varicose veins of left lower extremity with pain  -     Duplex Venous Lower Extremity - Bilateral CAR; Future    Other orders  -     conjugated estrogens (Premarin) 0.625 MG/GM vaginal cream; Insert  into the vagina Daily. Insert 1/2 gram 3 x weekly  Dispense: 30 g; Refill: 3  -     buPROPion XL (WELLBUTRIN XL) 150 MG 24 hr tablet; Take 1 tablet by mouth Daily.  Dispense: 30 tablet; Refill: 3      Discussed estring and intrarosa as alternatives      Counseling:  --Nutrition: Stressed importance of moderation and caloric balance, stressed fresh fruit and vegetables  --Exercise: Stressed the importance of regular exercise. 3-5 times weekly   - Discussed screening mammogram recommendations.   --Discussed benefits of screening colonoscopy- age 45 unless FH  --Discussed pap smear screening recommendations

## 2020-12-03 ENCOUNTER — TELEPHONE (OUTPATIENT)
Dept: OBSTETRICS AND GYNECOLOGY | Age: 40
End: 2020-12-03

## 2020-12-03 ENCOUNTER — HOSPITAL ENCOUNTER (OUTPATIENT)
Dept: CARDIOLOGY | Facility: HOSPITAL | Age: 40
Discharge: HOME | End: 2020-12-03
Admitting: OBSTETRICS & GYNECOLOGY

## 2020-12-03 DIAGNOSIS — M79.605 PAIN OF LEFT LOWER EXTREMITY: ICD-10-CM

## 2020-12-03 DIAGNOSIS — I83.812 VARICOSE VEINS OF LEFT LOWER EXTREMITY WITH PAIN: ICD-10-CM

## 2020-12-03 LAB
BH CV LOW VAS LEFT COMMON FEMORAL SPONT: 1
BH CV LOW VAS LEFT POPLITEAL SPONT: 1
BH CV LOW VAS LEFT PROXIMAL FEMORAL SPONT: 1
BH CV LOW VAS LEFT SAPHENOFEMORAL JUNCTION SPONT: 1
BH CV LOWER VASCULAR LEFT COMMON FEMORAL AUGMENT: NORMAL
BH CV LOWER VASCULAR LEFT COMMON FEMORAL COMPETENT: NORMAL
BH CV LOWER VASCULAR LEFT COMMON FEMORAL COMPRESS: NORMAL
BH CV LOWER VASCULAR LEFT COMMON FEMORAL PHASIC: NORMAL
BH CV LOWER VASCULAR LEFT COMMON FEMORAL SPONT: NORMAL
BH CV LOWER VASCULAR LEFT COMMON FEMORAL THROMBUS: NORMAL
BH CV LOWER VASCULAR LEFT DISTAL FEMORAL COMPRESS: NORMAL
BH CV LOWER VASCULAR LEFT GASTRONEMIUS COMPRESS: NORMAL
BH CV LOWER VASCULAR LEFT GREATER SAPH AK COMPRESS: NORMAL
BH CV LOWER VASCULAR LEFT GREATER SAPH BK COMPRESS: NORMAL
BH CV LOWER VASCULAR LEFT LESSER SAPH COMPRESS: NORMAL
BH CV LOWER VASCULAR LEFT MID FEMORAL AUGMENT: NORMAL
BH CV LOWER VASCULAR LEFT MID FEMORAL COMPETENT: NORMAL
BH CV LOWER VASCULAR LEFT MID FEMORAL COMPRESS: NORMAL
BH CV LOWER VASCULAR LEFT MID FEMORAL PHASIC: NORMAL
BH CV LOWER VASCULAR LEFT MID FEMORAL SPONT: NORMAL
BH CV LOWER VASCULAR LEFT PERONEAL COMPRESS: NORMAL
BH CV LOWER VASCULAR LEFT POPLITEAL AUGMENT: NORMAL
BH CV LOWER VASCULAR LEFT POPLITEAL COMPETENT: NORMAL
BH CV LOWER VASCULAR LEFT POPLITEAL COMPRESS: NORMAL
BH CV LOWER VASCULAR LEFT POPLITEAL PHASIC: NORMAL
BH CV LOWER VASCULAR LEFT POPLITEAL SPONT: NORMAL
BH CV LOWER VASCULAR LEFT POPLITEAL THROMBUS: NORMAL
BH CV LOWER VASCULAR LEFT POSTERIOR TIBIAL COMPRESS: NORMAL
BH CV LOWER VASCULAR LEFT PROFUNDA FEMORAL COMPRESS: NORMAL
BH CV LOWER VASCULAR LEFT PROXIMAL FEMORAL AUGMENT: NORMAL
BH CV LOWER VASCULAR LEFT PROXIMAL FEMORAL COMPRESS: NORMAL
BH CV LOWER VASCULAR LEFT PROXIMAL FEMORAL THROMBUS: NORMAL
BH CV LOWER VASCULAR LEFT SAPHENOFEMORAL JUNCTION COMPRESS: NORMAL
BH CV LOWER VASCULAR LEFT SAPHENOFEMORAL JUNCTION THROMBUS: NORMAL
BH CV LOWER VASCULAR RIGHT COMMON FEMORAL AUGMENT: NORMAL
BH CV LOWER VASCULAR RIGHT COMMON FEMORAL COMPETENT: NORMAL
BH CV LOWER VASCULAR RIGHT COMMON FEMORAL COMPRESS: NORMAL
BH CV LOWER VASCULAR RIGHT COMMON FEMORAL PHASIC: NORMAL
BH CV LOWER VASCULAR RIGHT COMMON FEMORAL SPONT: NORMAL
BH CV LOWER VASCULAR RIGHT DISTAL FEMORAL COMPRESS: NORMAL
BH CV LOWER VASCULAR RIGHT GASTRONEMIUS COMPRESS: NORMAL
BH CV LOWER VASCULAR RIGHT GREATER SAPH AK COMPRESS: NORMAL
BH CV LOWER VASCULAR RIGHT GREATER SAPH BK COMPRESS: NORMAL
BH CV LOWER VASCULAR RIGHT LESSER SAPH COMPRESS: NORMAL
BH CV LOWER VASCULAR RIGHT MID FEMORAL AUGMENT: NORMAL
BH CV LOWER VASCULAR RIGHT MID FEMORAL COMPETENT: NORMAL
BH CV LOWER VASCULAR RIGHT MID FEMORAL COMPRESS: NORMAL
BH CV LOWER VASCULAR RIGHT MID FEMORAL PHASIC: NORMAL
BH CV LOWER VASCULAR RIGHT MID FEMORAL SPONT: NORMAL
BH CV LOWER VASCULAR RIGHT PERONEAL COMPRESS: NORMAL
BH CV LOWER VASCULAR RIGHT POPLITEAL AUGMENT: NORMAL
BH CV LOWER VASCULAR RIGHT POPLITEAL COMPETENT: NORMAL
BH CV LOWER VASCULAR RIGHT POPLITEAL COMPRESS: NORMAL
BH CV LOWER VASCULAR RIGHT POPLITEAL PHASIC: NORMAL
BH CV LOWER VASCULAR RIGHT POPLITEAL SPONT: NORMAL
BH CV LOWER VASCULAR RIGHT POSTERIOR TIBIAL COMPRESS: NORMAL
BH CV LOWER VASCULAR RIGHT PROFUNDA FEMORAL COMPRESS: NORMAL
BH CV LOWER VASCULAR RIGHT PROXIMAL FEMORAL COMPRESS: NORMAL
BH CV LOWER VASCULAR RIGHT SAPHENOFEMORAL JUNCTION COMPRESS: NORMAL

## 2020-12-03 PROCEDURE — 93970 EXTREMITY STUDY: CPT

## 2020-12-03 NOTE — TELEPHONE ENCOUNTER
Spoke with pt   No acute DVT  Some very superficial thrombophlebitis  rec ASA for next 2 weeks, rec reach out to Dr Graham who pt has seen in past  Consider referral to Dr Marie for her chronic issues

## 2021-04-06 ENCOUNTER — BULK ORDERING (OUTPATIENT)
Dept: CASE MANAGEMENT | Facility: OTHER | Age: 41
End: 2021-04-06

## 2021-04-06 DIAGNOSIS — Z23 IMMUNIZATION DUE: ICD-10-CM

## 2021-04-21 RX ORDER — BUPROPION HYDROCHLORIDE 150 MG/1
150 TABLET ORAL DAILY
Qty: 90 TABLET | Refills: 2 | Status: SHIPPED | OUTPATIENT
Start: 2021-04-21 | End: 2021-09-24 | Stop reason: SDUPTHER

## 2021-04-21 NOTE — TELEPHONE ENCOUNTER
Wilmer pt requesting 90 day supply on her bupropion hcl xl 150 mg once daily.    Sent to [facility]    Next ae scheduled 12/02/2021

## 2021-09-24 ENCOUNTER — TELEPHONE (OUTPATIENT)
Dept: OBSTETRICS AND GYNECOLOGY | Age: 41
End: 2021-09-24

## 2021-09-24 RX ORDER — BUPROPION HYDROCHLORIDE 150 MG/1
150 TABLET ORAL DAILY
Qty: 90 TABLET | Refills: 0 | Status: SHIPPED | OUTPATIENT
Start: 2021-09-24 | End: 2022-03-28

## 2021-09-24 NOTE — TELEPHONE ENCOUNTER
Patient is requesting a refill on her generic Wellbutrin,150 mg.She stated her PCP retired and  agreed to prescribe her medicine.Pharmacy verified.She does get a 90 day supply at a time.

## 2022-03-28 RX ORDER — BUPROPION HYDROCHLORIDE 150 MG/1
TABLET ORAL
Qty: 90 TABLET | Refills: 0 | Status: SHIPPED | OUTPATIENT
Start: 2022-03-28 | End: 2022-07-05

## 2022-04-18 ENCOUNTER — OFFICE VISIT (OUTPATIENT)
Dept: OBSTETRICS AND GYNECOLOGY | Age: 42
End: 2022-04-18

## 2022-04-18 VITALS
BODY MASS INDEX: 25.03 KG/M2 | DIASTOLIC BLOOD PRESSURE: 74 MMHG | SYSTOLIC BLOOD PRESSURE: 120 MMHG | HEIGHT: 62 IN | WEIGHT: 136 LBS

## 2022-04-18 DIAGNOSIS — Z01.419 ENCOUNTER FOR GYNECOLOGICAL EXAMINATION WITHOUT ABNORMAL FINDING: Primary | ICD-10-CM

## 2022-04-18 DIAGNOSIS — N95.2 VAGINAL ATROPHY: ICD-10-CM

## 2022-04-18 PROCEDURE — 99396 PREV VISIT EST AGE 40-64: CPT | Performed by: OBSTETRICS & GYNECOLOGY

## 2022-04-18 RX ORDER — ESTRADIOL 4 UG/1
4 INSERT VAGINAL 2 TIMES WEEKLY
Qty: 24 EACH | Refills: 3 | Status: SHIPPED | OUTPATIENT
Start: 2022-04-18

## 2022-04-18 RX ORDER — CONJUGATED ESTROGENS 0.62 MG/G
CREAM VAGINAL DAILY
Qty: 30 G | Refills: 3 | Status: SHIPPED | OUTPATIENT
Start: 2022-04-18 | End: 2022-04-18

## 2022-04-18 NOTE — PROGRESS NOTES
Routine Annual Visit    2022    Patient: Jasmin Fuentes          MR#:[id number]      Chief Complaint   Patient presents with   • Gynecologic Exam     Last Pap  (-), Last Mammo 2015, Talk menopause and some medication.       History of Present Illness    41 y.o. female  who presents for annual exam.   TLH/BSO, bilateral mast ofr BRCA 2  No pap or mammo  Vag atrophy and insomnia only issues  Exercises regularly    Insomnia- lunesta primary  Estrogen    Will restart imvexxy 2 times weekly for vag atrophy    Patient's last menstrual period was 2017 (exact date).  Obstetric History:  OB History        2    Para   1    Term   1            AB        Living   2       SAB        IAB        Ectopic        Molar        Multiple        Live Births   2               Menstrual History:     Patient's last menstrual period was 2017 (exact date).       Sexual History:       ________________________________________  Patient Active Problem List   Diagnosis   • BRCA2 gene mutation positive   • Factor 5 Leiden mutation, heterozygous (HCC)   • Elevated CA-125       Past Medical History:   Diagnosis Date   • BRCA2 positive    • DVT (deep venous thrombosis) (HCC)    • Factor V Leiden mutation (HCC)    • Irregular menses    • Phlebitis    • Duran syndrome        Past Surgical History:   Procedure Laterality Date   • BREAST RECONSTRUCTION     •  SECTION     • MASTECTOMY     • TUBAL ABDOMINAL LIGATION         Social History     Tobacco Use   Smoking Status Never Smoker   Smokeless Tobacco Never Used       has a current medication list which includes the following prescription(s): bupropion xl, cetirizine, diphenhydramine, eszopiclone, fluticasone, valacyclovir, and imvexxy maintenance pack.  ________________________________________    Current contraception: status post hysterectomy  History of abnormal Pap smear: no  Family history of Breast cancer: yes  Family history of uterine  "or ovarian cancer: yes- status post BSO  Family History of colon cancer/colon polyps: no  History of abnormal mammogram: no      The following portions of the patient's history were reviewed and updated as appropriate: allergies, current medications, past family history, past medical history, past social history, past surgical history and problem list.    Review of Systems    Pertinent items are noted in HPI.     Objective   Physical Exam    /74   Ht 157.5 cm (62\")   Wt 61.7 kg (136 lb)   LMP 03/23/2017 (Exact Date)   Breastfeeding No   BMI 24.87 kg/m²    BP Readings from Last 3 Encounters:   04/18/22 120/74   11/30/20 110/68   03/13/19 106/66      Wt Readings from Last 3 Encounters:   04/18/22 61.7 kg (136 lb)   11/30/20 61.2 kg (135 lb)   03/13/19 59.4 kg (131 lb)      BMI: Estimated body mass index is 24.87 kg/m² as calculated from the following:    Height as of this encounter: 157.5 cm (62\").    Weight as of this encounter: 61.7 kg (136 lb).      General:   alert, appears stated age and cooperative   Abdomen: soft, non-tender, without masses or organomegaly   Breast: inspection negative, no nipple discharge or bleeding, no masses or nodularity palpable and implants, no breast tissue   Vulva: normal   Vagina: normal mucosa   Cervix: absent   Uterus: absent    Adnexa: no mass, fullness, tenderness     Assessment:    1. Normal annual exam   Assessment     ICD-10-CM ICD-9-CM   1. Encounter for gynecological examination without abnormal finding  Z01.419 V72.31   2. Vaginal atrophy  N95.2 627.3     Plan:    Plan     []  Mammogram request made  []  PAP done  []  Labs:   []  GC/Chl/TV  []  DEXA scan   []  Referral for colonoscopy:       Diagnoses and all orders for this visit:    1. Encounter for gynecological examination without abnormal finding (Primary)    2. Vaginal atrophy    Other orders  -     Discontinue: conjugated estrogens (Premarin) 0.625 MG/GM vaginal cream; Insert  into the vagina Daily. Insert " 1/2 gram 3 x weekly  Dispense: 30 g; Refill: 3  -     Estradiol (Imvexxy Maintenance Pack) 4 MCG insert; Insert 4 mcg into the vagina 2 (Two) Times a Week.  Dispense: 24 each; Refill: 3        dexa next year    Counseling:  --Nutrition: Stressed importance of moderation and caloric balance, stressed fresh fruit and vegetables  --Exercise: Stressed the importance of regular exercise. 3-5 times weekly   - Discussed screening mammogram recommendations.   --Discussed benefits of screening colonoscopy- age 45 unless FH  --Discussed pap smear screening recommendations

## 2022-07-05 RX ORDER — BUPROPION HYDROCHLORIDE 150 MG/1
TABLET ORAL
Qty: 90 TABLET | Refills: 0 | Status: SHIPPED | OUTPATIENT
Start: 2022-07-05 | End: 2022-10-12

## 2022-10-12 RX ORDER — BUPROPION HYDROCHLORIDE 150 MG/1
TABLET ORAL
Qty: 90 TABLET | Refills: 2 | Status: SHIPPED | OUTPATIENT
Start: 2022-10-12

## 2023-04-21 ENCOUNTER — OFFICE VISIT (OUTPATIENT)
Dept: OBSTETRICS AND GYNECOLOGY | Age: 43
End: 2023-04-21
Payer: COMMERCIAL

## 2023-04-21 ENCOUNTER — PROCEDURE VISIT (OUTPATIENT)
Dept: OBSTETRICS AND GYNECOLOGY | Age: 43
End: 2023-04-21
Payer: COMMERCIAL

## 2023-04-21 VITALS
BODY MASS INDEX: 23.56 KG/M2 | SYSTOLIC BLOOD PRESSURE: 114 MMHG | WEIGHT: 138 LBS | DIASTOLIC BLOOD PRESSURE: 70 MMHG | HEIGHT: 64 IN

## 2023-04-21 DIAGNOSIS — Z78.0 POST-MENOPAUSAL: Primary | ICD-10-CM

## 2023-04-21 DIAGNOSIS — Z01.419 ENCOUNTER FOR GYNECOLOGICAL EXAMINATION WITHOUT ABNORMAL FINDING: ICD-10-CM

## 2023-04-21 DIAGNOSIS — M85.80 OSTEOPENIA, SENILE: ICD-10-CM

## 2023-04-21 DIAGNOSIS — N95.2 VAGINAL ATROPHY: ICD-10-CM

## 2023-04-21 DIAGNOSIS — N95.1 MENOPAUSAL SYMPTOMS: Primary | ICD-10-CM

## 2023-04-21 RX ORDER — ESTRADIOL 4 UG/1
4 INSERT VAGINAL 2 TIMES WEEKLY
Qty: 24 EACH | Refills: 3 | Status: SHIPPED | OUTPATIENT
Start: 2023-04-24

## 2023-04-21 RX ORDER — BUPROPION HYDROCHLORIDE 75 MG/1
TABLET ORAL
COMMUNITY
Start: 2023-02-02

## 2023-04-21 NOTE — PROGRESS NOTES
Routine Annual Visit    2023    Patient: Jasmin Fuentes          MR#:[id number]      Chief Complaint   Patient presents with   • Gynecologic Exam     AE, dexa 2023  Cc:  menopause sx       History of Present Illness    43 y.o. female  who presents for annual exam.   Pt doing well, will use vaginal estrogen discussed  Mastectomy and hyst with BSO  Dad recently had stroke and that has been stressful    Got dexa today  Reviewed and some mild to moderate osteopenia, exercises frequently  Will recheck in 2-3 years          Patient's last menstrual period was 2017 (exact date).  Obstetric History:  OB History        2    Para   1    Term   1            AB        Living   2       SAB        IAB        Ectopic        Molar        Multiple        Live Births   2               Menstrual History:     Patient's last menstrual period was 2017 (exact date).       Sexual History:       ________________________________________  Patient Active Problem List   Diagnosis   • BRCA2 gene mutation positive   • Factor 5 Leiden mutation, heterozygous   • Elevated CA-125       Past Medical History:   Diagnosis Date   • BRCA2 positive    • DVT (deep venous thrombosis)    • Factor V Leiden mutation    • Irregular menses    • Phlebitis    • Duran syndrome        Past Surgical History:   Procedure Laterality Date   • BREAST RECONSTRUCTION     •  SECTION     • MASTECTOMY     • TUBAL ABDOMINAL LIGATION         Social History     Tobacco Use   Smoking Status Never   Smokeless Tobacco Never       has a current medication list which includes the following prescription(s): bupropion, cetirizine, [START ON 2023] imvexxy maintenance pack, eszopiclone, and valacyclovir.  ________________________________________    Current contraception: status post hysterectomy  History of abnormal Pap smear: no  Family history of Breast cancer: pt BRCA 2 + and status post bilateral mastectomy and  "BSO/hyst  Family history of uterine or ovarian cancer: no  Family History of colon cancer/colon polyps: no  History of abnormal mammogram: no      The following portions of the patient's history were reviewed and updated as appropriate: allergies, current medications, past family history, past medical history, past social history, past surgical history and problem list.    Review of Systems    Pertinent items are noted in HPI.     Objective   Physical Exam    /70   Ht 162.6 cm (64\")   Wt 62.6 kg (138 lb)   LMP 03/23/2017 (Exact Date)   BMI 23.69 kg/m²    BP Readings from Last 3 Encounters:   04/21/23 114/70   04/18/22 120/74   11/30/20 110/68      Wt Readings from Last 3 Encounters:   04/21/23 62.6 kg (138 lb)   04/18/22 61.7 kg (136 lb)   11/30/20 61.2 kg (135 lb)      BMI: Estimated body mass index is 23.69 kg/m² as calculated from the following:    Height as of this encounter: 162.6 cm (64\").    Weight as of this encounter: 62.6 kg (138 lb).      General:   alert, appears stated age and cooperative   Abdomen: soft, non-tender, without masses or organomegaly   Breast: inspection negative, no nipple discharge or bleeding, no masses or nodularity palpable and implants   Vulva: normal   Vagina: normal mucosa   Cervix: absent   Uterus: absent    Adnexa: no mass, fullness, tenderness     Assessment:    1. Normal annual exam   Assessment     ICD-10-CM ICD-9-CM   1. Menopausal symptoms  N95.1 627.2   2. Encounter for gynecological examination without abnormal finding  Z01.419 V72.31   3. Osteopenia, senile  M85.80 733.90   4. Vaginal atrophy  N95.2 627.3     Plan:    Plan     []  Mammogram request made  []  PAP done  []  Labs:   []  GC/Chl/TV  [x]  DEXA scan   []  Referral for colonoscopy:       Diagnoses and all orders for this visit:    1. Menopausal symptoms (Primary)    2. Encounter for gynecological examination without abnormal finding    3. Osteopenia, senile    4. Vaginal atrophy    Other orders  -     " Estradiol (Imvexxy Maintenance Pack) 4 MCG insert; Insert 4 mcg into the vagina 2 (Two) Times a Week.  Dispense: 24 each; Refill: 3            Counseling:  --Nutrition: Stressed importance of moderation and caloric balance, stressed fresh fruit and vegetables  --Exercise: Stressed the importance of regular exercise. 3-5 times weekly   - Discussed screening mammogram recommendations.   --Discussed benefits of screening colonoscopy- age 45 unless FH  --Discussed pap smear screening recommendations

## 2024-04-24 ENCOUNTER — OFFICE VISIT (OUTPATIENT)
Dept: OBSTETRICS AND GYNECOLOGY | Age: 44
End: 2024-04-24
Payer: COMMERCIAL

## 2024-04-24 VITALS
WEIGHT: 140 LBS | HEIGHT: 62 IN | BODY MASS INDEX: 25.76 KG/M2 | SYSTOLIC BLOOD PRESSURE: 118 MMHG | DIASTOLIC BLOOD PRESSURE: 70 MMHG

## 2024-04-24 DIAGNOSIS — Z15.09 BRCA2 GENE MUTATION POSITIVE: ICD-10-CM

## 2024-04-24 DIAGNOSIS — Z01.419 WELL WOMAN EXAM WITH ROUTINE GYNECOLOGICAL EXAM: Primary | ICD-10-CM

## 2024-04-24 DIAGNOSIS — Z90.79 S/P TOTAL ABDOMINAL HYSTERECTOMY AND BILATERAL SALPINGO-OOPHORECTOMY: ICD-10-CM

## 2024-04-24 DIAGNOSIS — Z15.01 BRCA2 GENE MUTATION POSITIVE: ICD-10-CM

## 2024-04-24 DIAGNOSIS — Z90.722 S/P TOTAL ABDOMINAL HYSTERECTOMY AND BILATERAL SALPINGO-OOPHORECTOMY: ICD-10-CM

## 2024-04-24 DIAGNOSIS — D68.51 FACTOR 5 LEIDEN MUTATION, HETEROZYGOUS: ICD-10-CM

## 2024-04-24 DIAGNOSIS — Z90.710 S/P TOTAL ABDOMINAL HYSTERECTOMY AND BILATERAL SALPINGO-OOPHORECTOMY: ICD-10-CM

## 2024-04-24 RX ORDER — ESTRADIOL 4 UG/1
4 INSERT VAGINAL 2 TIMES WEEKLY
Qty: 24 EACH | Refills: 3 | Status: SHIPPED | OUTPATIENT
Start: 2024-04-25

## 2024-04-24 NOTE — PROGRESS NOTES
Subjective     Chief Complaint   Patient presents with    Gynecologic Exam     AE, dexa 2023, mg 2015  Cc:  menopause issues       History of Present Illness    Jasmin Fuentes is a 44 y.o.  who presents for annual exam.    Doing well  BRCA 2+ Mastectomy and hyst with BSO   She is having weight gain, abdominal fat, fatigue  Works out regularly, eats pretty healthy  Does high stressful job, works for [employer]. States she doesn't sleep well.  She has factor V and not candidate for HRT  She was using vaginal estrogen but stopped, wants to restart  Pt of Dr. Giullen     Obstetric History:  OB History          2    Para   1    Term   1            AB        Living   2         SAB        IAB        Ectopic        Molar        Multiple        Live Births   2               Menstrual History:     Patient's last menstrual period was 2017 (exact date).         Current contraception: status post hysterectomy  History of abnormal Pap smear: no  Received Gardasil immunization: no  Family history of uterine or ovarian cancer: no  Family History of colon cancer: no  Family history of breast cancer: yes - see hx    Mammogram: not indicated.  Colonoscopy: recommended.  DEXA: up to date.    Exercise: moderately active  Calcium/Vitamin D: uses supplements    The following portions of the patient's history were reviewed and updated as appropriate: allergies, current medications, past family history, past medical history, past social history, past surgical history, and problem list.    Review of Systems   Constitutional:  Positive for fatigue and unexpected weight change.   Respiratory: Negative.     Cardiovascular: Negative.    Gastrointestinal: Negative.    Genitourinary:  Positive for dyspareunia.   Skin: Negative.    Psychiatric/Behavioral: Negative.             Objective   Physical Exam  Constitutional:       General: She is awake.      Appearance: Normal appearance. She is well-developed.    HENT:      Head: Normocephalic and atraumatic.      Nose: Nose normal.   Neck:      Thyroid: No thyroid mass, thyromegaly or thyroid tenderness.   Cardiovascular:      Rate and Rhythm: Normal rate and regular rhythm.      Pulses: Normal pulses.      Heart sounds: Normal heart sounds.   Pulmonary:      Effort: Pulmonary effort is normal.      Breath sounds: Normal breath sounds.   Chest:   Breasts:     Breasts are symmetrical.      Right: Normal. No swelling, bleeding, inverted nipple, mass, nipple discharge, skin change or tenderness.      Left: Normal. No swelling, bleeding, inverted nipple, mass, nipple discharge, skin change or tenderness.   Abdominal:      General: Abdomen is flat. Bowel sounds are normal.      Palpations: Abdomen is soft.      Tenderness: There is no abdominal tenderness.   Genitourinary:     General: Normal vulva.      Labia:         Right: No rash, tenderness, lesion or injury.         Left: No rash, tenderness, lesion or injury.       Urethra: No prolapse, urethral pain, urethral swelling or urethral lesion.      Vagina: Normal. No signs of injury. No vaginal discharge, erythema, tenderness, bleeding, lesions or prolapsed vaginal walls.      Adnexa: Right adnexa normal and left adnexa normal.        Right: No mass, tenderness or fullness.          Left: No mass, tenderness or fullness.        Rectum: Normal. No mass.      Comments: Uterus, cervix and ovaries absent   Musculoskeletal:      Cervical back: Normal range of motion and neck supple.   Lymphadenopathy:      Upper Body:      Right upper body: No supraclavicular adenopathy.      Left upper body: No supraclavicular adenopathy.   Skin:     General: Skin is warm and dry.   Neurological:      General: No focal deficit present.      Mental Status: She is alert and oriented to person, place, and time.   Psychiatric:         Mood and Affect: Mood normal.         Behavior: Behavior normal. Behavior is cooperative.         Thought Content:  "Thought content normal.         Judgment: Judgment normal.         /70   Ht 156.2 cm (61.5\")   Wt 63.5 kg (140 lb)   LMP 03/23/2017 (Exact Date)   BMI 26.02 kg/m²     Assessment & Plan   Diagnoses and all orders for this visit:    1. Well woman exam with routine gynecological exam (Primary)    2. S/P total abdominal hysterectomy and bilateral salpingo-oophorectomy    3. BRCA2 gene mutation positive    4. Factor 5 Leiden mutation, heterozygous    Other orders  -     Estradiol (Imvexxy Maintenance Pack) 4 MCG insert; Insert 4 mcg into the vagina 2 (Two) Times a Week.  Dispense: 24 each; Refill: 3        All questions answered.  Breast self exam technique reviewed and patient encouraged to perform self-exam monthly.  Discussed healthy lifestyle modifications.  Recommended 30 minutes of aerobic exercise five times per week.  Discussed calcium needs to prevent osteoporosis.    -F/u 1 year               "

## 2025-04-28 ENCOUNTER — OFFICE VISIT (OUTPATIENT)
Dept: OBSTETRICS AND GYNECOLOGY | Age: 45
End: 2025-04-28
Payer: COMMERCIAL

## 2025-04-28 VITALS
DIASTOLIC BLOOD PRESSURE: 74 MMHG | SYSTOLIC BLOOD PRESSURE: 106 MMHG | HEIGHT: 62 IN | WEIGHT: 117 LBS | BODY MASS INDEX: 21.53 KG/M2

## 2025-04-28 DIAGNOSIS — M85.80 OSTEOPENIA, SENILE: ICD-10-CM

## 2025-04-28 DIAGNOSIS — N95.2 VAGINAL ATROPHY: ICD-10-CM

## 2025-04-28 DIAGNOSIS — Z01.419 ENCOUNTER FOR GYNECOLOGICAL EXAMINATION WITHOUT ABNORMAL FINDING: Primary | ICD-10-CM

## 2025-04-28 PROCEDURE — 99396 PREV VISIT EST AGE 40-64: CPT | Performed by: OBSTETRICS & GYNECOLOGY

## 2025-04-28 RX ORDER — VALACYCLOVIR HYDROCHLORIDE 1 G/1
1000 TABLET, FILM COATED ORAL DAILY
Qty: 30 TABLET | Refills: 0 | Status: SHIPPED | OUTPATIENT
Start: 2025-04-28

## 2025-04-28 NOTE — PROGRESS NOTES
Routine Annual Visit    2025    Patient: Jasmin Fuentes          MR#:[id number]      Chief Complaint   Patient presents with    Gynecologic Exam     Annual Exam - last AE 24, hx of hyst, Hx of BRCA 2+ & Mastectomy, colonoscopy 10/27/23, dexa 23, pt has no complaints today       History of Present Illness    45 y.o. female  who presents for annual exam.     Patient is feeling well  She does use the vaginal estrogen  I will refill back  She also uses Valtrex for cold sores refill was given if this  Patient is working with her primary care on [place]  Hysterectomy Pap is not necessary  Bilateral mastectomy, mammogram not indicated  Patient is up-to-date on her colonoscopy  She is due for another DEXA scan  We discussed vitamin D and calcium  She is doing weightbearing exercise with Pilates and [place] theory      Patient's last menstrual period was 2017 (exact date).  Obstetric History:  OB History          2    Para   1    Term   1            AB        Living   2         SAB        IAB        Ectopic        Molar        Multiple        Live Births   2               Menstrual History:     Patient's last menstrual period was 2017 (exact date).       Sexual History:       ________________________________________  Patient Active Problem List   Diagnosis    BRCA2 gene mutation positive    Factor 5 Leiden mutation, heterozygous    Elevated CA-125    S/P total abdominal hysterectomy and bilateral salpingo-oophorectomy       Past Medical History:   Diagnosis Date    Abnormal Pap smear of cervix     BRCA2 positive     DVT (deep venous thrombosis)     Factor V Leiden mutation     Irregular menses     Phlebitis     Pulmonary embolism     Factor V Leiden    Duran syndrome        Past Surgical History:   Procedure Laterality Date    BREAST RECONSTRUCTION       SECTION       SECTION WITH TUBAL  2010    LAPAROSCOPIC ASSISTED VAGINAL HYSTERECTOMY SALPINGO  "OOPHORECTOMY  2017    MASTECTOMY      TUBAL ABDOMINAL LIGATION         Social History     Tobacco Use   Smoking Status Never   Smokeless Tobacco Never       has a current medication list which includes the following prescription(s): eszopiclone, magnesium, semaglutide-weight management, estradiol, and valacyclovir.  ________________________________________    Current contraception: status post hysterectomy  History of abnormal Pap smear: no  Family history of Breast cancer: pt is BRCA 2 +  Family history of uterine or ovarian cancer: yes - ovarian  Family History of colon cancer/colon polyps: no  History of abnormal mammogram: no      The following portions of the patient's history were reviewed and updated as appropriate: allergies, current medications, past family history, past medical history, past social history, past surgical history, and problem list.    Review of Systems    Pertinent items are noted in HPI.     Objective   Physical Exam    /74 (BP Location: Left arm, Patient Position: Sitting)   Ht 156.2 cm (61.5\")   Wt 53.1 kg (117 lb)   LMP 03/23/2017 (Exact Date)   BMI 21.75 kg/m²    BP Readings from Last 3 Encounters:   04/28/25 106/74   04/24/24 118/70   04/21/23 114/70      Wt Readings from Last 3 Encounters:   04/28/25 53.1 kg (117 lb)   04/24/24 63.5 kg (140 lb)   04/21/23 62.6 kg (138 lb)      BMI: Estimated body mass index is 21.75 kg/m² as calculated from the following:    Height as of this encounter: 156.2 cm (61.5\").    Weight as of this encounter: 53.1 kg (117 lb).      General:   alert, appears stated age, and cooperative   Abdomen: soft, non-tender, without masses or organomegaly   Breast: inspection negative, no nipple discharge or bleeding, no masses or nodularity palpable and implants   Vulva: normal, Bartholin's, Urethra, Villanueva's normal   Vagina: normal mucosa   Cervix: absent   Uterus: absent    Adnexa: no mass, fullness, tenderness     Assessment:    1. Normal annual exam "   Assessment     ICD-10-CM ICD-9-CM   1. Encounter for gynecological examination without abnormal finding  Z01.419 V72.31   2. Osteopenia, senile  M85.80 733.90   3. Vaginal atrophy  N95.2 627.3     Plan:    Plan     []  Mammogram request made  []  PAP done  []  Labs:   []  GC/Chl/TV  [x]  DEXA scan   []  Referral for colonoscopy:       Diagnoses and all orders for this visit:    1. Encounter for gynecological examination without abnormal finding (Primary)    2. Osteopenia, senile  -     dexa bone density axial; Future    3. Vaginal atrophy    Other orders  -     valACYclovir (Valtrex) 1000 MG tablet; Take 1 tablet by mouth Daily.  Dispense: 30 tablet; Refill: 0  -     Estradiol 10 MCG insert; Insert 10 mcg into the vagina 2 (Two) Times a Week.  Dispense: 24 each; Refill: 3            Counseling:  --Nutrition: Stressed importance of moderation and caloric balance, stressed fresh fruit and vegetables  --Exercise: Stressed the importance of regular exercise. 3-5 times weekly   - Discussed screening mammogram recommendations.   --Discussed benefits of screening colonoscopy- age 45 unless FH  --Discussed pap smear screening recommendations

## 2025-08-06 ENCOUNTER — HOSPITAL ENCOUNTER (OUTPATIENT)
Facility: HOSPITAL | Age: 45
Discharge: HOME | End: 2025-08-06
Admitting: OBSTETRICS & GYNECOLOGY
Payer: COMMERCIAL

## 2025-08-06 DIAGNOSIS — M85.80 OSTEOPENIA, SENILE: ICD-10-CM

## 2025-08-06 PROCEDURE — 77080 DXA BONE DENSITY AXIAL: CPT

## 2025-08-12 RX ORDER — VALACYCLOVIR HYDROCHLORIDE 1 G/1
1000 TABLET, FILM COATED ORAL DAILY
Qty: 30 TABLET | Refills: 0 | Status: SHIPPED | OUTPATIENT
Start: 2025-08-12

## 2025-08-19 DIAGNOSIS — M81.0 AGE-RELATED OSTEOPOROSIS WITHOUT CURRENT PATHOLOGICAL FRACTURE: Primary | ICD-10-CM

## 2025-08-19 DIAGNOSIS — E28.319 EARLY MENOPAUSE: ICD-10-CM
